# Patient Record
Sex: MALE | Race: WHITE | URBAN - METROPOLITAN AREA
[De-identification: names, ages, dates, MRNs, and addresses within clinical notes are randomized per-mention and may not be internally consistent; named-entity substitution may affect disease eponyms.]

---

## 2018-10-17 ENCOUNTER — APPOINTMENT (RX ONLY)
Dept: URBAN - METROPOLITAN AREA CLINIC 142 | Facility: CLINIC | Age: 48
Setting detail: DERMATOLOGY
End: 2018-10-17

## 2018-10-17 DIAGNOSIS — L20.89 OTHER ATOPIC DERMATITIS: ICD-10-CM

## 2018-10-17 PROBLEM — L20.84 INTRINSIC (ALLERGIC) ECZEMA: Status: ACTIVE | Noted: 2018-10-17

## 2018-10-17 PROCEDURE — ? TREATMENT REGIMEN

## 2018-10-17 PROCEDURE — ? PRESCRIPTION

## 2018-10-17 PROCEDURE — ? COUNSELING

## 2018-10-17 PROCEDURE — 99213 OFFICE O/P EST LOW 20 MIN: CPT

## 2018-10-17 RX ORDER — CLOBETASOL PROPIONATE 0.5 MG/G
CREAM TOPICAL
Qty: 60 | Refills: 6 | Status: ERX | COMMUNITY
Start: 2018-10-17

## 2018-10-17 RX ADMIN — CLOBETASOL PROPIONATE: 0.5 CREAM TOPICAL at 00:00

## 2018-10-17 ASSESSMENT — LOCATION DETAILED DESCRIPTION DERM
LOCATION DETAILED: RIGHT PROXIMAL DORSAL FOREARM
LOCATION DETAILED: LEFT ANTERIOR PROXIMAL THIGH
LOCATION DETAILED: RIGHT ANTERIOR PROXIMAL THIGH
LOCATION DETAILED: LEFT PROXIMAL DORSAL FOREARM

## 2018-10-17 ASSESSMENT — LOCATION ZONE DERM
LOCATION ZONE: LEG
LOCATION ZONE: ARM

## 2018-10-17 ASSESSMENT — LOCATION SIMPLE DESCRIPTION DERM
LOCATION SIMPLE: RIGHT FOREARM
LOCATION SIMPLE: LEFT THIGH
LOCATION SIMPLE: LEFT FOREARM
LOCATION SIMPLE: RIGHT THIGH

## 2018-11-08 ENCOUNTER — RX ONLY (OUTPATIENT)
Age: 48
Setting detail: RX ONLY
End: 2018-11-08

## 2018-11-08 RX ORDER — PREDNISONE 10 MG/1
TABLET ORAL
Qty: 24 | Refills: 0 | COMMUNITY
Start: 2018-11-08

## 2019-04-04 ENCOUNTER — APPOINTMENT (RX ONLY)
Dept: URBAN - METROPOLITAN AREA CLINIC 142 | Facility: CLINIC | Age: 49
Setting detail: DERMATOLOGY
End: 2019-04-04

## 2019-04-04 DIAGNOSIS — L20.89 OTHER ATOPIC DERMATITIS: ICD-10-CM

## 2019-04-04 DIAGNOSIS — B35.3 TINEA PEDIS: ICD-10-CM

## 2019-04-04 PROBLEM — L20.84 INTRINSIC (ALLERGIC) ECZEMA: Status: ACTIVE | Noted: 2019-04-04

## 2019-04-04 PROCEDURE — ? PRESCRIPTION

## 2019-04-04 PROCEDURE — ? COUNSELING

## 2019-04-04 PROCEDURE — 99213 OFFICE O/P EST LOW 20 MIN: CPT

## 2019-04-04 PROCEDURE — ? TREATMENT REGIMEN

## 2019-04-04 RX ORDER — DOXYCYCLINE HYCLATE 100 MG/1
CAPSULE, GELATIN COATED ORAL
Qty: 20 | Refills: 0 | Status: ERX | COMMUNITY
Start: 2019-04-04

## 2019-04-04 RX ORDER — KETOCONAZOLE 20 MG/G
CREAM TOPICAL
Qty: 1 | Refills: 3 | Status: ERX | COMMUNITY
Start: 2019-04-04

## 2019-04-04 RX ORDER — TRIAMCINOLONE ACETONIDE 1 MG/G
OINTMENT TOPICAL
Qty: 1 | Refills: 2 | Status: ERX | COMMUNITY
Start: 2019-04-04

## 2019-04-04 RX ORDER — HYDROXYZINE HYDROCHLORIDE 25 MG/1
TABLET, FILM COATED ORAL
Qty: 30 | Refills: 1 | Status: ERX | COMMUNITY
Start: 2019-04-04

## 2019-04-04 RX ADMIN — HYDROXYZINE HYDROCHLORIDE: 25 TABLET, FILM COATED ORAL at 15:02

## 2019-04-04 RX ADMIN — KETOCONAZOLE: 20 CREAM TOPICAL at 15:02

## 2019-04-04 RX ADMIN — TRIAMCINOLONE ACETONIDE: 1 OINTMENT TOPICAL at 15:05

## 2019-04-04 RX ADMIN — DOXYCYCLINE HYCLATE: 100 CAPSULE, GELATIN COATED ORAL at 15:04

## 2019-04-04 ASSESSMENT — LOCATION ZONE DERM
LOCATION ZONE: LEG
LOCATION ZONE: FEET

## 2019-04-04 ASSESSMENT — LOCATION SIMPLE DESCRIPTION DERM
LOCATION SIMPLE: RIGHT PRETIBIAL REGION
LOCATION SIMPLE: LEFT PRETIBIAL REGION
LOCATION SIMPLE: LEFT PLANTAR SURFACE
LOCATION SIMPLE: RIGHT PLANTAR SURFACE

## 2019-04-04 ASSESSMENT — LOCATION DETAILED DESCRIPTION DERM
LOCATION DETAILED: LEFT DISTAL PRETIBIAL REGION
LOCATION DETAILED: RIGHT DISTAL PRETIBIAL REGION
LOCATION DETAILED: LEFT MEDIAL PLANTAR MIDFOOT
LOCATION DETAILED: RIGHT MEDIAL PLANTAR MIDFOOT

## 2019-04-04 NOTE — PROCEDURE: TREATMENT REGIMEN
Plan: Advised bleach bath
Otc Regimen: cerave anti-itch, cetaphil, vanicream, fragrance free soap and detergent
Detail Level: Zone

## 2019-10-05 ENCOUNTER — HOSPITAL ENCOUNTER (EMERGENCY)
Dept: HOSPITAL 96 - M.ERS | Age: 49
Discharge: HOME | End: 2019-10-05
Payer: COMMERCIAL

## 2019-10-05 VITALS — SYSTOLIC BLOOD PRESSURE: 135 MMHG | DIASTOLIC BLOOD PRESSURE: 82 MMHG

## 2019-10-05 VITALS — BODY MASS INDEX: 30.16 KG/M2 | WEIGHT: 199.01 LBS | HEIGHT: 68 IN

## 2019-10-05 DIAGNOSIS — E11.621: Primary | ICD-10-CM

## 2019-10-05 DIAGNOSIS — L03.116: ICD-10-CM

## 2019-10-05 DIAGNOSIS — L97.428: ICD-10-CM

## 2019-10-05 LAB
ABSOLUTE BASOPHILS: 0.1 THOU/UL (ref 0–0.2)
ABSOLUTE EOSINOPHILS: 0.2 THOU/UL (ref 0–0.7)
ABSOLUTE MONOCYTES: 0.7 THOU/UL (ref 0–1.2)
ALBUMIN SERPL-MCNC: 3.8 G/DL (ref 3.4–5)
ALP SERPL-CCNC: 115 U/L (ref 46–116)
ALT SERPL-CCNC: 32 U/L (ref 30–65)
ANION GAP SERPL CALC-SCNC: 10 MMOL/L (ref 7–16)
AST SERPL-CCNC: 24 U/L (ref 15–37)
BASOPHILS NFR BLD AUTO: 1.1 %
BE: -2.7 MMOL/L
BILIRUB SERPL-MCNC: 0.4 MG/DL
BUN SERPL-MCNC: 16 MG/DL (ref 7–18)
CALCIUM SERPL-MCNC: 8.7 MG/DL (ref 8.5–10.1)
CHLORIDE SERPL-SCNC: 98 MMOL/L (ref 98–107)
CO2 SERPL-SCNC: 25 MMOL/L (ref 21–32)
CREAT SERPL-MCNC: 0.8 MG/DL (ref 0.6–1.3)
EOSINOPHIL NFR BLD: 1.9 %
GAS PNL BLDV: 64.5 MMHG (ref 35–45)
GLUCOSE SERPL-MCNC: 488 MG/DL (ref 70–99)
GRANULOCYTES NFR BLD MANUAL: 72.9 %
HCT VFR BLD CALC: 41.5 % (ref 42–52)
HGB BLD-MCNC: 14.4 GM/DL (ref 14–18)
LYMPHOCYTES # BLD: 1.2 THOU/UL (ref 0.8–5.3)
LYMPHOCYTES NFR BLD AUTO: 15.1 %
MCH RBC QN AUTO: 27.7 PG (ref 26–34)
MCHC RBC AUTO-ENTMCNC: 34.6 G/DL (ref 28–37)
MCV RBC: 80.1 FL (ref 80–100)
MONOCYTES NFR BLD: 9 %
MPV: 7.5 FL. (ref 7.2–11.1)
NEUTROPHILS # BLD: 5.9 THOU/UL (ref 1.6–8.1)
NUCLEATED RBCS: 0 /100WBC
PCO2 BLDV: 43.9 MMHG (ref 41–51)
PLATELET COUNT*: 235 THOU/UL (ref 150–400)
POTASSIUM SERPL-SCNC: 3.9 MMOL/L (ref 3.5–5.1)
PROT SERPL-MCNC: 7.1 G/DL (ref 6.4–8.2)
RBC # BLD AUTO: 5.18 MIL/UL (ref 4.5–6)
RDW-CV: 13 % (ref 10.5–14.5)
SODIUM SERPL-SCNC: 133 MMOL/L (ref 136–145)
TROPONIN-I LEVEL: <0.06 NG/ML (ref ?–0.06)
WBC # BLD AUTO: 8.1 THOU/UL (ref 4–11)

## 2019-10-07 NOTE — EKG
[place] [address]
Phone:  [phone]                     ELECTROCARDIOGRAM REPORT      
_______________________________________________________________________________
 
Name:       AWILDA VU        Room:                      [facility]#:  [id number]      Account #:      [id number]  
Admission:  10/05/19     Attend Phys:                         
Discharge:  10/05/19     Date of Birth:  [birth year]  
         Report #: [id number]
    [id number]
_______________________________________________________________________________
THIS REPORT FOR:  //name//                      
 
                         [facility] ED
                                       
Test Date:    2019-10-05               Test Time:    08:12:27
Pat Name:     AWILDA VU          Department:   
Patient ID:   [id number]            Room:          
Gender:                               Technician:   
:          1970               Requested By: Dilip Crook
Order Number: [id number] MD:   Manny Wilson
                                 Measurements
Intervals                              Axis          
Rate:         118                      P:            23
GA:           160                      QRS:          17
QRSD:         113                      T:            2
QT:           334                                    
QTc:          469                                    
                           Interpretive Statements
Sinus tachycardia
Borderline intraventricular conduction delay
No previous ECG available for comparison
 
Electronically Signed On 10-7-2019 16:11:49 CDT by Manny Wilson
[email or web address]
 
 
 
 
 
 
 
 
 
 
 
 
 
 
 
 
 
 
 
  <ELECTRONICALLY SIGNED>
                                           By: Manny Wilson MD, [facility]     
  10/07/19     1611
D: 10/05/19 0812   _____________________________________
T: 10/05/19 0812   Manny Wilson MD, FACC       /EPI

## 2019-10-17 ENCOUNTER — HOSPITAL ENCOUNTER (OUTPATIENT)
Dept: HOSPITAL 96 - M.WC | Age: 49
End: 2019-10-17
Payer: COMMERCIAL

## 2019-10-17 DIAGNOSIS — L97.522: ICD-10-CM

## 2019-10-17 DIAGNOSIS — I10: ICD-10-CM

## 2019-10-17 DIAGNOSIS — E11.621: Primary | ICD-10-CM

## 2019-10-17 DIAGNOSIS — Z87.891: ICD-10-CM

## 2019-10-18 LAB
EST. AVERAGE GLUCOSE BLD GHB EST-MCNC: 292 MG/DL
GLYCOHEMOGLOBIN (HGB A1C): 11.8 % (ref 4.8–5.6)

## 2019-10-24 ENCOUNTER — HOSPITAL ENCOUNTER (OUTPATIENT)
Dept: HOSPITAL 96 - M.WC | Age: 49
End: 2019-10-24
Attending: FAMILY MEDICINE
Payer: COMMERCIAL

## 2019-10-24 DIAGNOSIS — I10: ICD-10-CM

## 2019-10-24 DIAGNOSIS — Z87.891: ICD-10-CM

## 2019-10-24 DIAGNOSIS — L97.522: ICD-10-CM

## 2019-10-24 DIAGNOSIS — E11.42: ICD-10-CM

## 2019-10-24 DIAGNOSIS — E11.65: ICD-10-CM

## 2019-10-24 DIAGNOSIS — E11.621: Primary | ICD-10-CM

## 2019-11-01 ENCOUNTER — HOSPITAL ENCOUNTER (OUTPATIENT)
Dept: HOSPITAL 96 - M.WC | Age: 49
End: 2019-11-01
Attending: FAMILY MEDICINE
Payer: COMMERCIAL

## 2019-11-01 DIAGNOSIS — L97.522: ICD-10-CM

## 2019-11-01 DIAGNOSIS — E11.621: Primary | ICD-10-CM

## 2019-11-01 DIAGNOSIS — I10: ICD-10-CM

## 2019-11-01 DIAGNOSIS — Z87.891: ICD-10-CM

## 2019-11-01 DIAGNOSIS — E11.65: ICD-10-CM

## 2019-11-01 DIAGNOSIS — L84: ICD-10-CM

## 2019-11-08 ENCOUNTER — HOSPITAL ENCOUNTER (OUTPATIENT)
Dept: HOSPITAL 96 - M.WC | Age: 49
End: 2019-11-08
Attending: FAMILY MEDICINE
Payer: COMMERCIAL

## 2019-11-08 DIAGNOSIS — E11.65: ICD-10-CM

## 2019-11-08 DIAGNOSIS — Z87.891: ICD-10-CM

## 2019-11-08 DIAGNOSIS — E11.621: Primary | ICD-10-CM

## 2019-11-08 DIAGNOSIS — I10: ICD-10-CM

## 2019-11-08 DIAGNOSIS — L97.522: ICD-10-CM

## 2019-11-14 ENCOUNTER — HOSPITAL ENCOUNTER (OUTPATIENT)
Dept: HOSPITAL 96 - M.WC | Age: 49
End: 2019-11-14
Attending: FAMILY MEDICINE
Payer: COMMERCIAL

## 2019-11-14 DIAGNOSIS — Z87.891: ICD-10-CM

## 2019-11-14 DIAGNOSIS — L84: ICD-10-CM

## 2019-11-14 DIAGNOSIS — E11.621: Primary | ICD-10-CM

## 2019-11-14 DIAGNOSIS — E11.65: ICD-10-CM

## 2019-11-14 DIAGNOSIS — I10: ICD-10-CM

## 2019-11-14 DIAGNOSIS — L97.522: ICD-10-CM

## 2019-11-22 ENCOUNTER — HOSPITAL ENCOUNTER (OUTPATIENT)
Dept: HOSPITAL 96 - M.WC | Age: 49
End: 2019-11-22
Attending: FAMILY MEDICINE
Payer: COMMERCIAL

## 2019-11-22 DIAGNOSIS — E11.621: Primary | ICD-10-CM

## 2019-11-22 DIAGNOSIS — L84: ICD-10-CM

## 2019-11-22 DIAGNOSIS — L97.522: ICD-10-CM

## 2019-11-22 DIAGNOSIS — E11.65: ICD-10-CM

## 2019-11-22 DIAGNOSIS — Z87.891: ICD-10-CM

## 2019-11-22 DIAGNOSIS — I10: ICD-10-CM

## 2019-12-06 ENCOUNTER — HOSPITAL ENCOUNTER (OUTPATIENT)
Dept: HOSPITAL 96 - M.WC | Age: 49
End: 2019-12-06
Attending: FAMILY MEDICINE
Payer: COMMERCIAL

## 2019-12-06 DIAGNOSIS — E11.621: Primary | ICD-10-CM

## 2019-12-06 DIAGNOSIS — L97.522: ICD-10-CM

## 2019-12-06 DIAGNOSIS — E11.42: ICD-10-CM

## 2019-12-06 DIAGNOSIS — L84: ICD-10-CM

## 2019-12-06 DIAGNOSIS — E11.65: ICD-10-CM

## 2019-12-06 DIAGNOSIS — Z87.891: ICD-10-CM

## 2019-12-06 DIAGNOSIS — I10: ICD-10-CM

## 2019-12-20 ENCOUNTER — HOSPITAL ENCOUNTER (OUTPATIENT)
Dept: HOSPITAL 96 - M.WC | Age: 49
End: 2019-12-20
Attending: FAMILY MEDICINE
Payer: COMMERCIAL

## 2019-12-20 DIAGNOSIS — E11.621: Primary | ICD-10-CM

## 2019-12-20 DIAGNOSIS — E11.65: ICD-10-CM

## 2019-12-20 DIAGNOSIS — E11.42: ICD-10-CM

## 2019-12-20 DIAGNOSIS — L84: ICD-10-CM

## 2019-12-20 DIAGNOSIS — Z87.891: ICD-10-CM

## 2019-12-20 DIAGNOSIS — L97.521: ICD-10-CM

## 2019-12-20 DIAGNOSIS — I10: ICD-10-CM

## 2020-11-25 ENCOUNTER — HOSPITAL ENCOUNTER (EMERGENCY)
Dept: HOSPITAL 96 - M.ERS | Age: 50
Discharge: HOME | End: 2020-11-25
Payer: COMMERCIAL

## 2020-11-25 VITALS — WEIGHT: 189.99 LBS | HEIGHT: 69 IN | BODY MASS INDEX: 28.14 KG/M2

## 2020-11-25 VITALS — SYSTOLIC BLOOD PRESSURE: 111 MMHG | DIASTOLIC BLOOD PRESSURE: 64 MMHG

## 2020-11-25 DIAGNOSIS — E11.9: ICD-10-CM

## 2020-11-25 DIAGNOSIS — I10: ICD-10-CM

## 2020-11-25 DIAGNOSIS — L03.032: Primary | ICD-10-CM

## 2020-11-25 DIAGNOSIS — I16.0: ICD-10-CM

## 2020-11-25 LAB
ABSOLUTE BASOPHILS: 0.1 THOU/UL (ref 0–0.2)
ABSOLUTE EOSINOPHILS: 0.1 THOU/UL (ref 0–0.7)
ABSOLUTE MONOCYTES: 0.5 THOU/UL (ref 0–1.2)
ALBUMIN SERPL-MCNC: 4 G/DL (ref 3.4–5)
ALP SERPL-CCNC: 82 U/L (ref 46–116)
ALT SERPL-CCNC: 32 U/L (ref 30–65)
ANION GAP SERPL CALC-SCNC: 9 MMOL/L (ref 7–16)
AST SERPL-CCNC: 13 U/L (ref 15–37)
BASOPHILS NFR BLD AUTO: 0.9 %
BILIRUB SERPL-MCNC: 0.4 MG/DL
BUN SERPL-MCNC: 11 MG/DL (ref 7–18)
CALCIUM SERPL-MCNC: 8.6 MG/DL (ref 8.5–10.1)
CHLORIDE SERPL-SCNC: 100 MMOL/L (ref 98–107)
CO2 SERPL-SCNC: 27 MMOL/L (ref 21–32)
CREAT SERPL-MCNC: 0.7 MG/DL (ref 0.6–1.3)
EOSINOPHIL NFR BLD: 1.9 %
GLUCOSE SERPL-MCNC: 328 MG/DL (ref 70–99)
GRANULOCYTES NFR BLD MANUAL: 71.6 %
HCT VFR BLD CALC: 42.6 % (ref 42–52)
HGB BLD-MCNC: 14.7 GM/DL (ref 14–18)
LYMPHOCYTES # BLD: 1.3 THOU/UL (ref 0.8–5.3)
LYMPHOCYTES NFR BLD AUTO: 18.1 %
MCH RBC QN AUTO: 26.9 PG (ref 26–34)
MCHC RBC AUTO-ENTMCNC: 34.4 G/DL (ref 28–37)
MCV RBC: 78.1 FL (ref 80–100)
MONOCYTES NFR BLD: 7.5 %
MPV: 7.2 FL. (ref 7.2–11.1)
NEUTROPHILS # BLD: 5 THOU/UL (ref 1.6–8.1)
NUCLEATED RBCS: 0 /100WBC
PLATELET COUNT*: 260 THOU/UL (ref 150–400)
POTASSIUM SERPL-SCNC: 3.8 MMOL/L (ref 3.5–5.1)
PROT SERPL-MCNC: 7.1 G/DL (ref 6.4–8.2)
RBC # BLD AUTO: 5.45 MIL/UL (ref 4.5–6)
RDW-CV: 13.3 % (ref 10.5–14.5)
SODIUM SERPL-SCNC: 136 MMOL/L (ref 136–145)
WBC # BLD AUTO: 7 THOU/UL (ref 4–11)

## 2021-01-21 ENCOUNTER — HOSPITAL ENCOUNTER (INPATIENT)
Dept: HOSPITAL 96 - M.PRE | Age: 51
LOS: 5 days | Discharge: HOME | DRG: 622 | End: 2021-01-26
Attending: INTERNAL MEDICINE | Admitting: INTERNAL MEDICINE
Payer: COMMERCIAL

## 2021-01-21 VITALS — BODY MASS INDEX: 28.34 KG/M2 | HEIGHT: 67.99 IN | WEIGHT: 187 LBS

## 2021-01-21 VITALS — DIASTOLIC BLOOD PRESSURE: 82 MMHG | SYSTOLIC BLOOD PRESSURE: 132 MMHG

## 2021-01-21 VITALS — DIASTOLIC BLOOD PRESSURE: 87 MMHG | SYSTOLIC BLOOD PRESSURE: 134 MMHG

## 2021-01-21 DIAGNOSIS — L03.032: ICD-10-CM

## 2021-01-21 DIAGNOSIS — E11.65: ICD-10-CM

## 2021-01-21 DIAGNOSIS — E11.621: ICD-10-CM

## 2021-01-21 DIAGNOSIS — S91.109A: ICD-10-CM

## 2021-01-21 DIAGNOSIS — E11.42: ICD-10-CM

## 2021-01-21 DIAGNOSIS — I10: ICD-10-CM
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
## 2021-01-21 DIAGNOSIS — Z87.891: ICD-10-CM

## 2021-01-21 DIAGNOSIS — Z20.822: ICD-10-CM

## 2021-01-21 DIAGNOSIS — L97.519: ICD-10-CM
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
## 2021-01-21 DIAGNOSIS — Z91.19: ICD-10-CM

## 2021-01-21 DIAGNOSIS — J15.6: ICD-10-CM

## 2021-01-21 DIAGNOSIS — E11.628: Primary | ICD-10-CM
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
## 2021-01-21 DIAGNOSIS — L97.529: ICD-10-CM

## 2021-01-21 DIAGNOSIS — L03.031: ICD-10-CM
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
## 2021-01-21 LAB
ALBUMIN SERPL-MCNC: 2.5 G/DL (ref 3.4–5)
ALP SERPL-CCNC: 125 U/L (ref 46–116)
ALT SERPL-CCNC: 22 U/L (ref 30–65)
ANION GAP SERPL CALC-SCNC: 12 MMOL/L (ref 7–16)
AST SERPL-CCNC: 13 U/L (ref 15–37)
BILIRUB SERPL-MCNC: 0.7 MG/DL
BILIRUB UR-MCNC: NEGATIVE MG/DL
BUN SERPL-MCNC: 12 MG/DL (ref 7–18)
CALCIUM SERPL-MCNC: 9.4 MG/DL (ref 8.5–10.1)
CHLORIDE SERPL-SCNC: 101 MMOL/L (ref 98–107)
CO2 SERPL-SCNC: 25 MMOL/L (ref 21–32)
COLOR UR: YELLOW
CREAT SERPL-MCNC: 0.8 MG/DL (ref 0.6–1.3)
GLUCOSE SERPL-MCNC: 274 MG/DL (ref 70–99)
HCT VFR BLD CALC: 39.5 % (ref 42–52)
HGB BLD-MCNC: 13.2 GM/DL (ref 14–18)
KETONES UR STRIP-MCNC: (no result) MG/DL
MCH RBC QN AUTO: 26.7 PG (ref 26–34)
MCHC RBC AUTO-ENTMCNC: 33.5 G/DL (ref 28–37)
MCV RBC: 79.7 FL (ref 80–100)
MPV: 6.9 FL. (ref 7.2–11.1)
PLATELET COUNT*: 276 THOU/UL (ref 150–400)
POTASSIUM SERPL-SCNC: 4.2 MMOL/L (ref 3.5–5.1)
PROT SERPL-MCNC: 6.9 G/DL (ref 6.4–8.2)
PROT UR QL STRIP: (no result)
RBC # BLD AUTO: 4.96 MIL/UL (ref 4.5–6)
RBC # UR STRIP: NEGATIVE /UL
RDW-CV: 14.2 % (ref 10.5–14.5)
SODIUM SERPL-SCNC: 138 MMOL/L (ref 136–145)
SP GR UR STRIP: 1.02 (ref 1–1.03)
URINE CLARITY: CLEAR
URINE GLUCOSE-RANDOM: (no result)
URINE LEUKOCYTES-REFLEX: NEGATIVE
URINE NITRITE-REFLEX: NEGATIVE
UROBILINOGEN UR STRIP-ACNC: 0.2 E.U./DL (ref 0.2–1)
WBC # BLD AUTO: 12.5 THOU/UL (ref 4–11)

## 2021-01-21 NOTE — NUR
I ASSUMED CARE OF THE PATIENT AS AN ADMISSION FROM THE ED.  HE ARRIVED VIA
CART AND TRANSFERED ON HIS OWN.  THE IS ALERT AND ORIENTED X4 AND BED IS IN
THE LOW LOCKED POSITION.  CALL LIGHT IS IN REACH AND PATIENT NEEDS ARE MET
WHILE HOURLY ROUNDING.  CONSULT WITH INFECTIOUS DISEASE IS COMPLETED AND NEW
ORDERS ARE OBTAINED.  BOTH FEET ARE CULTURED AND SENT OFF.  CXRAY CAME BACK
AND HE IS TRANSFERED TO [facility] FOR ISOLATION.  REPORT IS CALLED AND PATIENT AND
BELONGINGS ARE DELIVERED AFTER CT IS COMPLETED.  RESOURCE NURSE COMPLETED
ADMISSION.  MED SURG STATUS IS MET.  POSSIBLE ULTRASOUND AND MRI TOMORROW.
PATIENT WAS A DIRECT ADMIT AND BLOOD GLUCOSE IS MONITORED.

## 2021-01-22 VITALS — DIASTOLIC BLOOD PRESSURE: 90 MMHG | SYSTOLIC BLOOD PRESSURE: 145 MMHG

## 2021-01-22 VITALS — SYSTOLIC BLOOD PRESSURE: 144 MMHG | DIASTOLIC BLOOD PRESSURE: 87 MMHG

## 2021-01-22 LAB
ALBUMIN SERPL-MCNC: 2.9 G/DL (ref 3.4–5)
ALP SERPL-CCNC: 101 U/L (ref 46–116)
ALT SERPL-CCNC: 17 U/L (ref 30–65)
ANION GAP SERPL CALC-SCNC: 8 MMOL/L (ref 7–16)
AST SERPL-CCNC: 10 U/L (ref 15–37)
BILIRUB SERPL-MCNC: 0.4 MG/DL
BUN SERPL-MCNC: 9 MG/DL (ref 7–18)
CALCIUM SERPL-MCNC: 8.4 MG/DL (ref 8.5–10.1)
CHLORIDE SERPL-SCNC: 104 MMOL/L (ref 98–107)
CO2 SERPL-SCNC: 26 MMOL/L (ref 21–32)
CREAT SERPL-MCNC: 0.7 MG/DL (ref 0.6–1.3)
EST. AVERAGE GLUCOSE BLD GHB EST-MCNC: 309 MG/DL
GLUCOSE SERPL-MCNC: 257 MG/DL (ref 70–99)
GLYCOHEMOGLOBIN (HGB A1C): 12.4 % (ref 4.8–5.6)
HCT VFR BLD CALC: 35.3 % (ref 42–52)
HGB BLD-MCNC: 12.1 GM/DL (ref 14–18)
MAGNESIUM SERPL-MCNC: 2.1 MG/DL (ref 1.8–2.4)
MCH RBC QN AUTO: 27.2 PG (ref 26–34)
MCHC RBC AUTO-ENTMCNC: 34.2 G/DL (ref 28–37)
MCV RBC: 79.4 FL (ref 80–100)
MPV: 7.3 FL. (ref 7.2–11.1)
PLATELET COUNT*: 235 THOU/UL (ref 150–400)
POTASSIUM SERPL-SCNC: 4 MMOL/L (ref 3.5–5.1)
PROT SERPL-MCNC: 6.4 G/DL (ref 6.4–8.2)
RBC # BLD AUTO: 4.45 MIL/UL (ref 4.5–6)
RDW-CV: 14.2 % (ref 10.5–14.5)
SODIUM SERPL-SCNC: 138 MMOL/L (ref 136–145)
WBC # BLD AUTO: 9.8 THOU/UL (ref 4–11)

## 2021-01-22 NOTE — NUR
PT IS AO X4 WITH RA SATTING 98-99. PT HAS A NONPRODUCTIVE COUGH THAT IS
FREQUENT. PHOTOS TAKEN OF FOOT WOUNDS FOR ADMISSION. HE HAS COMPLETED SEVERAL
STUDIES AD THE RESULTS RE ON THE CHART.PT HAS SLEPT VERY LITTLE THIS PM SHIFT
AND HAS HAD MANY QUESTIONS. GOOD PO INTAKE AND AMBULATING TO THE BATHROOM

## 2021-01-22 NOTE — NUR
PT.LIVES ALONE. WORKS OUTSIDE THE HOME. NO USE OF DME. NO HX OF HH OR SNF.
WOUND CX AND BLOOD CX PENDING. PODAITRY CONSULT PENDING. PT.SEEMS TO BE VERY
NON COMPLIANT WITH HIS DIABETES, TAKING HIS MEDS,TAKING CARE OF HIMSELF.
CM WILL CONTINUE TO FOLLOW.

## 2021-01-22 NOTE — NUR
WOUND NURSE:  PATIENT SEEN TO ADDRESS WOUNDS ON BLE.  RIGHT GREAT TOE, PLANTAR
SURFACE PRESENTS WITH CALLOUSED PERIWOUND AND DARK RED NONGRANULATINGN TISSUE
IN A WOUND BED MEASURING 1 X 1 X 0.1 CM, WOUND ON 5TH DIGIT AND METATARSAL OF
RIGHT FOOT PRSENTING AS A DARK PURPLISH LESION WITH FLUCTUANCE AND PINK
PURULENT DRAINAGE PRESENT IN SMALL AMOUNT. WOUND BED IS OBSCURRED FROM
PRESENCE OF CALLOUS AND LOOSE EPIDERMIS.  PATIENT STATES LESIONS STARTED BACK
IN NOVEMBER OF LAST YEAR.  PATIENT REPORTS NO PAIN R/T DIABETIC NEUROPATHY.
CLEANSED WITH SOAP AND WATER, RINSED WITH WATER, THEN PATTED DRY.  APPLIED
AQUACEL AG TO BOTH WOUNDS.  COVERED RT FOOT WOUND WITH ABD UNDER KERLEX THEN
SECURED WITH TAPE.  LEFT GREAT TOE WOUND COVERED WITH CONFORMING GAUZE AND
SECURED WITH TAPE.  THIS WAS TOLERATED WELL BY THE PATIENT.  PATIENT
INSTRUCTED ON MEASURES TO PROMOTE HEALING E.G., OFFLOADING WOUNDS, DIET WITH
ADEQUATE PROTEIN, VIT C, AND ZINC.  PATIENT STATES HE UNDERSTNADS.

## 2021-01-23 VITALS — DIASTOLIC BLOOD PRESSURE: 87 MMHG | SYSTOLIC BLOOD PRESSURE: 141 MMHG

## 2021-01-23 VITALS — SYSTOLIC BLOOD PRESSURE: 153 MMHG | DIASTOLIC BLOOD PRESSURE: 95 MMHG

## 2021-01-23 VITALS — SYSTOLIC BLOOD PRESSURE: 138 MMHG | DIASTOLIC BLOOD PRESSURE: 89 MMHG

## 2021-01-23 VITALS — DIASTOLIC BLOOD PRESSURE: 91 MMHG | SYSTOLIC BLOOD PRESSURE: 148 MMHG

## 2021-01-23 LAB
ALBUMIN SERPL-MCNC: 2.7 G/DL (ref 3.4–5)
ALP SERPL-CCNC: 103 U/L (ref 46–116)
ALT SERPL-CCNC: 15 U/L (ref 30–65)
ANION GAP SERPL CALC-SCNC: 8 MMOL/L (ref 7–16)
AST SERPL-CCNC: 7 U/L (ref 15–37)
BILIRUB SERPL-MCNC: 0.4 MG/DL
BUN SERPL-MCNC: 8 MG/DL (ref 7–18)
CALCIUM SERPL-MCNC: 8.7 MG/DL (ref 8.5–10.1)
CHLORIDE SERPL-SCNC: 102 MMOL/L (ref 98–107)
CO2 SERPL-SCNC: 29 MMOL/L (ref 21–32)
CREAT SERPL-MCNC: 0.8 MG/DL (ref 0.6–1.3)
GLUCOSE SERPL-MCNC: 286 MG/DL (ref 70–99)
HCT VFR BLD CALC: 34.8 % (ref 42–52)
HGB BLD-MCNC: 11.4 GM/DL (ref 14–18)
MAGNESIUM SERPL-MCNC: 1.9 MG/DL (ref 1.8–2.4)
MCH RBC QN AUTO: 26.4 PG (ref 26–34)
MCHC RBC AUTO-ENTMCNC: 32.9 G/DL (ref 28–37)
MCV RBC: 80.3 FL (ref 80–100)
MPV: 7.3 FL. (ref 7.2–11.1)
PLATELET COUNT*: 274 THOU/UL (ref 150–400)
POTASSIUM SERPL-SCNC: 3.9 MMOL/L (ref 3.5–5.1)
PROT SERPL-MCNC: 6.4 G/DL (ref 6.4–8.2)
RBC # BLD AUTO: 4.33 MIL/UL (ref 4.5–6)
RDW-CV: 14 % (ref 10.5–14.5)
SODIUM SERPL-SCNC: 139 MMOL/L (ref 136–145)
WBC # BLD AUTO: 8.4 THOU/UL (ref 4–11)

## 2021-01-23 NOTE — NUR
ASSUMED P T'S CARE AT ABOUT 2000. ALERT AND ORIENTED. ABLE TO AMBULATE
INDEPENDENTLY TO BATHROOM. DRESSING TO LEFT AND RIGHT FOOT C/D/I. PT DID GET
SOME SLEEP BUT WAS MOSTLY ANXIOUS AND TEARFUL ABOUT THE POSSIBLITY OF LOSING
HIS LEFT 5TH TOE. TYENOL GIVEN FOR PAIN PER PT'S REQUEST. PRN BENADRL ALSO
GIVEN TO HELP WITH ANXIETY. PT REINFORCED ON IMPORTANCE OF ADHERING TO HEALTHY
DIET AND LIFESTYLES IN ORDER TO HELP REDUCE COMPLICATIONS OF DIABETES. PT
VOICED REPEATED FURSTRATION AND FEAR OF LOSING HIS TOE, ALTHOUGH HE VOICED
THAT THE DOC SAID IT WAS A "POSSIBLITY" BUT NOT CERTAIN.  NURSING STAYED AND
LISTENED TO PT TO CALM AND ENCOURAGE PT. PT VOICED HE WOULD TRY TO PRACTICE
BETTER HEALTHY CHOICES. CALL LIGHT WITHIN REACH. WILL CONTINUE TO MONITOR.

## 2021-01-24 VITALS — DIASTOLIC BLOOD PRESSURE: 97 MMHG | SYSTOLIC BLOOD PRESSURE: 157 MMHG

## 2021-01-24 VITALS — SYSTOLIC BLOOD PRESSURE: 157 MMHG | DIASTOLIC BLOOD PRESSURE: 91 MMHG

## 2021-01-24 VITALS — SYSTOLIC BLOOD PRESSURE: 160 MMHG | DIASTOLIC BLOOD PRESSURE: 93 MMHG

## 2021-01-24 LAB
ANION GAP SERPL CALC-SCNC: 7 MMOL/L (ref 7–16)
BUN SERPL-MCNC: 8 MG/DL (ref 7–18)
CALCIUM SERPL-MCNC: 9.3 MG/DL (ref 8.5–10.1)
CHLORIDE SERPL-SCNC: 104 MMOL/L (ref 98–107)
CO2 SERPL-SCNC: 30 MMOL/L (ref 21–32)
CREAT SERPL-MCNC: 0.7 MG/DL (ref 0.6–1.3)
GLUCOSE SERPL-MCNC: 109 MG/DL (ref 70–99)
HCT VFR BLD CALC: 35.9 % (ref 42–52)
HGB BLD-MCNC: 12 GM/DL (ref 14–18)
MCH RBC QN AUTO: 26.8 PG (ref 26–34)
MCHC RBC AUTO-ENTMCNC: 33.3 G/DL (ref 28–37)
MCV RBC: 80.3 FL (ref 80–100)
MPV: 7.1 FL. (ref 7.2–11.1)
PLATELET COUNT*: 315 THOU/UL (ref 150–400)
POTASSIUM SERPL-SCNC: 3.3 MMOL/L (ref 3.5–5.1)
RBC # BLD AUTO: 4.47 MIL/UL (ref 4.5–6)
RDW-CV: 14 % (ref 10.5–14.5)
SODIUM SERPL-SCNC: 141 MMOL/L (ref 136–145)
WBC # BLD AUTO: 7.1 THOU/UL (ref 4–11)

## 2021-01-24 NOTE — NUR
PT AO X4 STAYING IN BED MOST OF THE DAY. HE IS ANXIOUS ABOUT LOSING HIS TOE
AND GETS QUITE UPSET ABOUT IT.  BLOOD SUGARS WELL CONTROLLED ON SSI . PT IS
TRYING TO MAKE BETTER DIETARY CHOICES TO HELP CONTROL DM. PT DENIES PAIN,
LUNGS CTA WITH NON PRODUCTIVE COUGH.  PT IS STILL ON RA. POTASSIUM HAS BEEN
REPLACED TODAY AND AWAITING REDRAW. PT IS TO HAVE MRI TOMORROW AND DR HOYT
HAS MADE HIM NPO AFTER 6AM FOR POSSIBLE SURGERY

## 2021-01-25 VITALS — DIASTOLIC BLOOD PRESSURE: 84 MMHG | SYSTOLIC BLOOD PRESSURE: 143 MMHG

## 2021-01-25 VITALS — SYSTOLIC BLOOD PRESSURE: 135 MMHG | DIASTOLIC BLOOD PRESSURE: 72 MMHG

## 2021-01-25 VITALS — SYSTOLIC BLOOD PRESSURE: 162 MMHG | DIASTOLIC BLOOD PRESSURE: 95 MMHG

## 2021-01-25 LAB
ANION GAP SERPL CALC-SCNC: 5 MMOL/L (ref 7–16)
BUN SERPL-MCNC: 9 MG/DL (ref 7–18)
CALCIUM SERPL-MCNC: 9.1 MG/DL (ref 8.5–10.1)
CHLORIDE SERPL-SCNC: 103 MMOL/L (ref 98–107)
CO2 SERPL-SCNC: 30 MMOL/L (ref 21–32)
CREAT SERPL-MCNC: 0.8 MG/DL (ref 0.6–1.3)
GLUCOSE SERPL-MCNC: 193 MG/DL (ref 70–99)
HCT VFR BLD CALC: 37.8 % (ref 42–52)
HGB BLD-MCNC: 12.3 GM/DL (ref 14–18)
MCH RBC QN AUTO: 26.4 PG (ref 26–34)
MCHC RBC AUTO-ENTMCNC: 32.6 G/DL (ref 28–37)
MCV RBC: 80.8 FL (ref 80–100)
MPV: 7 FL. (ref 7.2–11.1)
PLATELET COUNT*: 306 THOU/UL (ref 150–400)
POTASSIUM SERPL-SCNC: 4.3 MMOL/L (ref 3.5–5.1)
RBC # BLD AUTO: 4.68 MIL/UL (ref 4.5–6)
RDW-CV: 13.8 % (ref 10.5–14.5)
SODIUM SERPL-SCNC: 138 MMOL/L (ref 136–145)
WBC # BLD AUTO: 7.1 THOU/UL (ref 4–11)

## 2021-01-25 NOTE — NUR
WOUND NURSE:  PLANNED TO SEE PATIENT FOR WOUND ASSESSMENT, BUT PATIENT
REPORTING THAT DR. GARCIA HAS COME IN EVERYDAY TO CHANGE HIS DRESSING AND LOOK
AT HIS FEET.  PATIENT NOT ASSESSED BY THIS NURSE AS A RESULT.

## 2021-01-25 NOTE — NUR
UNABLE TO SPEAK WITH PT.TODAY. WAS GONE MOST OF LATE MORNING/EARLY AFTERNOON
FOR MRI. POTENTIAL FOR INTERVENTION BY  PENDING RESULTS OF MRI.

## 2021-01-25 NOTE — NUR
PT A&OX4 VSS. PT UP AD MILES. WOUNDS TO BLE, DR GARCIA FOLLOWING. PT NPO THIS AM
PRIOR TO MRI. PT ACCUCHECK, INSULIN ADMINISTERED AS DIRECTED. PT CLEARED TO
RESUME CARB CONTROL DIET [id number] RESULTS OF MRI. IV TO LFA, ABX INFUSING AS
ORDERED. PT REMAINS CONTINENT OF B/B. DRESSINGS/WOUNDS ASSESSED BY DR GARCIA
THIS EVENING. PT RESTS ION BED WITH CALL LIGHT IN REACH.

## 2021-01-25 NOTE — NUR
RIGHT BASILIC VESSEL ACCESSED FOR SINGLE LUMEN PICC.  LINE PRE-TRIMMED TO 38CM
AND ADVANCED TO THE ZERO PARAMJIT WITH NO RESISTANCE MET.  UPPER ARM CIRCUMFERENCE
ABOVE INSERTION SITE=12".  POST PROCEDURE CXR SHOWS LINE TERMINATES BELOW THE
ELI 3-4CM.  GUIDEWIRE REMOVED, LINE FLUSHED AND INSERTION SITE DRESSED.
REPORT GIVEN TO LANG PENG.

## 2021-01-26 VITALS — DIASTOLIC BLOOD PRESSURE: 93 MMHG | SYSTOLIC BLOOD PRESSURE: 157 MMHG

## 2021-01-26 VITALS — SYSTOLIC BLOOD PRESSURE: 157 MMHG | DIASTOLIC BLOOD PRESSURE: 93 MMHG

## 2021-01-26 LAB
ANION GAP SERPL CALC-SCNC: 4 MMOL/L (ref 7–16)
BUN SERPL-MCNC: 10 MG/DL (ref 7–18)
CALCIUM SERPL-MCNC: 9.3 MG/DL (ref 8.5–10.1)
CHLORIDE SERPL-SCNC: 102 MMOL/L (ref 98–107)
CO2 SERPL-SCNC: 33 MMOL/L (ref 21–32)
CREAT SERPL-MCNC: 0.7 MG/DL (ref 0.6–1.3)
GLUCOSE SERPL-MCNC: 96 MG/DL (ref 70–99)
HCT VFR BLD CALC: 37.7 % (ref 42–52)
HGB BLD-MCNC: 12.5 GM/DL (ref 14–18)
MCH RBC QN AUTO: 26.3 PG (ref 26–34)
MCHC RBC AUTO-ENTMCNC: 33.3 G/DL (ref 28–37)
MCV RBC: 79.1 FL (ref 80–100)
MPV: 6.9 FL. (ref 7.2–11.1)
PLATELET COUNT*: 360 THOU/UL (ref 150–400)
POTASSIUM SERPL-SCNC: 3.7 MMOL/L (ref 3.5–5.1)
RBC # BLD AUTO: 4.76 MIL/UL (ref 4.5–6)
RDW-CV: 13.9 % (ref 10.5–14.5)
SODIUM SERPL-SCNC: 139 MMOL/L (ref 136–145)
WBC # BLD AUTO: 8.8 THOU/UL (ref 4–11)

## 2021-01-26 NOTE — NUR
PT.TO DISCHARGE TODAY TO HOME ON ORAL ANTIBIOTICS. DISCUSSED WITH . NO
HH ORDERED. PT.TO FOLLOW UP IN WOUND CARE CLINIC IN ONE WEEK WITH .
PT.COVID POSITIVE ON 1/21. [facility] SAID HE WOULD NEED TO BE PUSHED BACK TO
FEB.3 AT 1300. CM CONTACTED  OFFICE TO MAKE SURE IT WAS OK TO NOT SEE
PT.FOR 2 WEEKS.  SAID THAT WOULD BE JUST FINE. HIS OFFICE
WOULD NOT HAVE SEEN HIM UNITL 2/4 SO WILL KEEP WOUND CARE CENTER APPT. NURSING
TO TEACH PT./MOM HOW TO CHANGE DRESSING TO TOE.  TALKED WITH PT.ABOUT
BEING COMPLIANT WITH METFORMIN AND HIS ANTIBIOTICS. MOM WILL TRANSPORT
PT.HOME.

## 2021-01-26 NOTE — NUR
PT LEFT UNIT AT 1300
PT A&OX4 VSS. PT UP AD MILES, NO WB RESTRICTIONS. DRESSING TO R FOOT CHANGED
PRIOR TO PT LEAVING UNIT, PT EDUCATED REGARDING DRESING CHANGES AT HOME AND
PROVIDED SUPPLIES FOR WOUND CARE. PT STATES UNDERSTANDING OF DC INSTRUCTIONS
AND FOLLOW-UP INFORMATION PROVIDED. PT INSTRUCTED TO REMAIN IN ISOLATION UNTIL
AT LEAST 01/31/2021. IV TO LFA REMOVED PRIOR TO DC, PICC TO [facility] REMAINS IN
PLACE PER DR YUEN. PT DRESSED INDEPENDENTLY. PT LEFT UNIT IN
WHEELCHAIR WITH ALL PERSONAL BELONGINGS. PT HAS PRINTED RX FOR NEW
MEDICATIONS. PT LEAVES UNIT IN WHEELCHAIR, TRANSPORTED BY NURSING STAFF. PT
REFUSED LUNCH PRIOR TO LEAVING STATING HE WAS LEAVING TO GO HOME AND THAT
WOULD BE "STUPID".

## 2021-01-26 NOTE — NUR
PATIENT HAS SLEPT WELL THROUGHOUT MOST OF THE NIGHT. VSS ON RA. NO C/O PAIN.
MEDICATIONS GIVEN AS ORDERED AND CHARTED. ASSESSMENTT AS CHARTED. PICC LINE TO
RIGHT UPPER ARM- IV ABT GIVEN AS ORDERED. IV TO LEFT FOREARM-SL. DRESSING TO
BILATERAL FEET ARE C/D/I. PATIENT STILL HAVING DRY NON-PRODUCTIVE COUGH. NURSE
SPOKE WITH PATIENTS MOTHER THIS AM REGARDING PATIENT AND ANSWERING QUESTIONS
THAT SHE HAD. PATIENT INSTRUCTED TO USE CALL LIGHT WHEN NEEDING ASSISTANCE.
H0URLY ROUNDS MADE. WILL CONTINUE WITH PLAN OF CARE AND NURSING TO MONITOR.

## 2021-01-27 NOTE — CON
[address]  [address]                    CONSULTATION                  
_______________________________________________________________________________
 
Name:       JOSETRISHAAWILDA CURTIS        Room:           [address] [place]  
M.R.#:  [id number]      Account #:      [id number]  
Admission:  01/21/21     Attend Phys:    Zane Harley MD 
Discharge:  01/26/21     Date of Birth:  [birth year]  
         Report #: [id number]
                                                                     [id number]  
_______________________________________________________________________________
THIS REPORT FOR:  
 
cc:  Aby Echevarria Ghaison F. DO                                               ~
     Junior Huynh DPM           
 
 
DATE OF SERVICE:  01/23/2021
 
 
ADMISSION DIAGNOSES:  Ulceration with cellulitis, right foot.
 
CHIEF COMPLAINT/HISTORY OF PRESENT ILLNESS:  A 50-year-old  male,
admitted through the Emergency Department with worsening inflammation and
blistering to the right distal lateral foot near the fifth MTP.  He also has an
ulceration to the left plantar hallux.  He has type 2 diabetes mellitus with
peripheral neuropathy.  He relates trimming a callus to the right foot back in
November with subsequent wound development.  The etiology of the left plantar
hallux wound is less apparent.  He did present to the Emergency Department in
November and had a shot of ceftriaxone.  It is unclear whether the patient has
been following up with a provider for the wounds, although he says he has seen
Dr. Fransisco Mckeon in the past at [facility].  He is
currently on parenteral vancomycin and Zosyn.  Blood cultures are negative x 2,
right foot wound culture is growing gram-negative rods and gram-positive cocci. 
CT scan of the foot was negative for bone destruction and arterial Doppler
ultrasound showed triphasic waveforms to the extremities without signs of focal
stenosis.
 
LABORATORY DATA:  WBC 9.8, hemoglobin 12.1, hematocrit 35.3, platelets 235.  BUN
9, creatinine 0.7, glucose 257.  Hemoglobin A1c is 12.4.  Albumin is 2.9.
 
PHYSICAL EXAMINATION:  There is a superficial ulceration to the right fifth MTP
region.  There is regional erythema, edema and fluctuance with no underlying
crepitation.  There is sloughing of the overlying skin with bulla formation upon
debridement of the skin.  The underlying wound bed has a soft friable type
consistency at the lateral aspect of the fifth MTP joint.  There is no visible
or palpable bone.  There is no underlying crepitation.  There is no tunneling or
signs of deep penetration.  The skin blistering extends to the plantar aspect of
the foot, but there is no deep penetration present.  He has palpable dorsalis
pedis and posterior tibial pulses bilaterally.  There is a discrete ulceration
to the plantar aspect of the left hallux that measures roughly 2.0 x 2.0 x 0.3
cm.  There is a fibrotic base with a keratotic rim.  There is no inflammation or
cardinal signs of infection to it.
 
IMPRESSION:
1.  Soft tissue infection with ulceration, right foot, exacerbated with poorly
 
 
 
[place] [address]                    CONSULTATION                  
_______________________________________________________________________________
 
Name:       AWILDA VU        Room:           [address] [place]  
M.R.#:  [id number]      Account #:      [id number]  
Admission:  01/21/21     Attend Phys:    Zane Harley MD 
Discharge:  01/26/21     Date of Birth:  [birth year]  
         Report #: [id number]
                                                                     [id number]  
_______________________________________________________________________________
 
 
controlled type 2 diabetes mellitus.
2.  Ulceration to left plantar hallux without clinical infection.
 
PLAN:  I performed an extensive wound debridement at bedside with scissors and
forceps to remove skin and subcutaneous tissue from the right lateral foot
wound.  I took an aerobic swab culture of the underlying tissue.  The wound was
cleansed with wound cleanser and dressed with ABD, Kerlix gauze.  I ordered an
x-ray of the right foot.  I do not plan on taking him to the operating room at
this point, as I would like to see how the tissue responds to the parenteral
antibiotics and wound debridement.  I will monitor him daily during his
hospitalization.  Pending clinical course, may perform MRI.
 
 
 
 
 
 
 
 
 
 
 
 
 
 
 
 
 
 
 
 
 
 
 
 
 
 
 
 
 
 
 
<ELECTRONICALLY SIGNED>
                                        By:  Junior Huynh DPM          
01/27/21     1303
D: 01/23/21 0822_______________________________________
T: 01/23/21 0837Junior Huynh DPM             /nt

## 2021-01-27 NOTE — CON
[address]  [address]                    CONSULTATION                  
_______________________________________________________________________________
 
Name:       JOSESOTEROFINESSESAWILDA CURTIS        Room:           [address] [place]  
M.R.#:  [id number]      Account #:      [id number]  
Admission:  01/21/21     Attend Phys:    Zane Harley MD 
Discharge:  01/26/21     Date of Birth:  [birth year]  
         Report #: [id number]
                                                                     [id number]  
_______________________________________________________________________________
THIS REPORT FOR:  
 
cc:  Aby Echevarria Ghaison F. DO                                               ~
     Junior Huynh DPM           
 
 
DATE OF SERVICE:  01/23/2021
 
 
CHIEF COMPLAINT:  Followup of ulceration with soft tissue infection to right
foot centered at the fifth MTP joint.  He has concomitant ulceration to the left
plantar hallux with no clinical infection.  A recent swab culture of the right
foot wound grew Staphylococcus epidermidis.  He is on parenteral vancomycin and
Zosyn with good tolerance.  Right foot x-ray was negative for bone destruction
or osteomyelitis with specific attention to the right fifth MTP joint.  The
patient remained afebrile, relates low-grade right foot pain.  He remains
nonweightbearing.
 
LABORATORY DATA:  WBC 8.4, RBC 4.33, hemoglobin 11.4, hematocrit 34.8, platelets
274.  BUN 8, creatinine 0.8, glucose 286.
 
PHYSICAL EXAMINATION:  There is a significant decrease in erythema to the right
foot.  There is brown/black eschar to the lateral fifth MTP joint with no
underlying crepitation.  The inflammations localized around the dorsolateral to
lateral fifth MTP.  The erythema has receded along the dorsal and plantar foot. 
He has palpable pedal pulses with no pallor/cyanosis or signs of acute vascular
embarrassment.  Ulceration to the left plantar hallux is relatively unchanged
with no erythema or exposed bone to the wound bed.
 
IMPRESSION:  Soft tissue infection with ulceration, right foot; ulceration, left
plantar hallux; type 2 diabetes mellitus with peripheral neuropathy.
 
PLAN:  The wounds were cleansed and redressed with Aquacel Ag, ABDs, Kerlix and
gauze.  We will monitor the patient tomorrow, I am not anticipating any formal
surgical debridement in the operating room at this point.
 
 
 
 
 
 
 
 
<ELECTRONICALLY SIGNED>
                                        By:  Junior Huynh DPM          
01/27/21     1303
D: 01/23/21 1607_______________________________________
T: 01/23/21 1747Dasundeep Huynh DPM             /nt

## 2021-01-27 NOTE — CON
[address]  [address]                    CONSULTATION                  
_______________________________________________________________________________
 
Name:       AWILDA VU        Room:           [address] [place]  
M.R.#:  [id number]      Account #:      [id number]  
Admission:  01/21/21     Attend Phys:    Zane Harley MD 
Discharge:  01/26/21     Date of Birth:  [birth year]  
         Report #: [id number]
                                                                     [id number]  
_______________________________________________________________________________
THIS REPORT FOR:  
 
cc:  Aby Echevarria Ghaison F. DO                                               ~
     Junior Huynh DPM           
 
 
DATE OF SERVICE:  01/24/2021
 
 
CHIEF COMPLAINT:  Ulceration with soft tissue infection, right distal fifth MTP
complicated by poorly controlled type 2 diabetes mellitus.  He has concomitant
ulceration to the left plantar hallux without clinical signs of infection.  Gram
stain shows gram-negative rods and gram-positive cocci.  Prior swab culture to
the same area grew Staphylococcus epidermidis.  Post-admission CT and foot
radiographs negative for osteomyelitis, subcutaneous emphysema or abscess. 
Blood cultures negative x 2.  He remains on parenteral vancomycin and Zosyn with
good tolerance.  He has remained afebrile, relates improved appetite.  He has
low-grade pain to the right fifth MTP area.
 
LABORATORY DATA:  WBC 7.1, RBC 4.47, hemoglobin 12.0, hematocrit 35.9 and
platelets 315.  BUN 8, creatinine 0.7 and glucose 109.  Hemoglobin A1c 12.4.
 
PHYSICAL EXAMINATION:  Temperature 98.2, pulse 91, respirations 18 and blood
pressure 157/97.  The erythema to the right distal fifth MTP is decreased since
yesterday, but still centered around the lateral wound which is mostly a black
eschar.  The wound measures roughly 3.0 x 2.7 cm and has a hard black eschar
with a small peripheral area of red granulation.  The erythema is localized
circumferentially around the eschar extending to the dorsal foot, but receding
since yesterday.  There is no significant erythema to the plantar aspect of the
foot.  There is no fluctuance or crepitation to the region.  He has palpable
dorsalis pedis and posterior tibial pulses bilaterally with immediate periwound
and digital capillary refill.  Ulceration to the left plantar hallux is roughly
1.5 x 1.0 x 0.3 cm with red granulation and a peripheral rim of keratosis.  No
erythema or cardinal signs of infection to it.
 
IMPRESSION:  Soft tissue infection with ulceration, right distal lateral foot
with concomitant type 2 diabetes mellitus; cannot rule out septic
arthritis/osteomyelitis.
 
PLAN:  I recommend MRI with and without contrast, and I see that has already
been ordered this morning to be performed tomorrow.  I explained to patient that
I cannot rule out the possibility of a right distal fifth ray resection for
osteomyelitis.  I explained that foot radiographs and CT scan are very
insensitive to acute osteomyelitis, where an MRI will show intramedullary
inflammation within the bone.  He understands that he is at risk for losing part
 
 
 
[place] [address]                    CONSULTATION                  
_______________________________________________________________________________
 
Name:       AWILDA VU        Room:           [address] [place]  
M.R.#:  [id number]      Account #:      [id number]  
Admission:  01/21/21     Attend Phys:    Zane Harley MD 
Discharge:  01/26/21     Date of Birth:  [birth year]  
         Report #: [id number]
                                                                     [id number]  
_______________________________________________________________________________
 
 
of his foot, although I assured him that I will remain conservative as much as
possible.  I will discuss with medicine regarding long-term antibiotics.
 
 
 
 
 
 
 
 
 
 
 
 
 
 
 
 
 
 
 
 
 
 
 
 
 
 
 
 
 
 
 
 
 
 
 
 
 
 
 
 
<ELECTRONICALLY SIGNED>
                                        By:  Junior Huynh DPM          
01/27/21     1303
D: 01/24/21 1240_______________________________________
T: 01/24/21 2043Dtiti Huynh DPM             /nt

## 2021-02-03 ENCOUNTER — HOSPITAL ENCOUNTER (OUTPATIENT)
Dept: HOSPITAL 96 - M.WC | Age: 51
End: 2021-02-03
Attending: PODIATRIST
Payer: COMMERCIAL

## 2021-02-03 DIAGNOSIS — L97.512: ICD-10-CM

## 2021-02-03 DIAGNOSIS — E11.621: Primary | ICD-10-CM

## 2021-02-03 DIAGNOSIS — L84: ICD-10-CM

## 2021-02-03 DIAGNOSIS — Z87.891: ICD-10-CM

## 2021-02-03 DIAGNOSIS — L97.522: ICD-10-CM

## 2021-02-03 DIAGNOSIS — I10: ICD-10-CM

## 2021-02-03 LAB
ABSOLUTE BASOPHILS: 0.2 THOU/UL (ref 0–0.2)
ABSOLUTE EOSINOPHILS: 0.1 THOU/UL (ref 0–0.7)
ABSOLUTE MONOCYTES: 0.5 THOU/UL (ref 0–1.2)
ANION GAP SERPL CALC-SCNC: 12 MMOL/L (ref 7–16)
BASOPHILS NFR BLD AUTO: 2 %
BUN SERPL-MCNC: 17 MG/DL (ref 7–18)
CALCIUM SERPL-MCNC: 9.4 MG/DL (ref 8.5–10.1)
CHLORIDE SERPL-SCNC: 100 MMOL/L (ref 98–107)
CO2 SERPL-SCNC: 27 MMOL/L (ref 21–32)
CREAT SERPL-MCNC: 0.8 MG/DL (ref 0.6–1.3)
EOSINOPHIL NFR BLD: 1.6 %
ESR (SEDRATE): 6 MM/HR (ref 0–20)
GLUCOSE SERPL-MCNC: 264 MG/DL (ref 70–99)
GRANULOCYTES NFR BLD MANUAL: 74.8 %
HCT VFR BLD CALC: 41.3 % (ref 42–52)
HGB BLD-MCNC: 13.6 GM/DL (ref 14–18)
LYMPHOCYTES # BLD: 1.3 THOU/UL (ref 0.8–5.3)
LYMPHOCYTES NFR BLD AUTO: 15.9 %
MCH RBC QN AUTO: 26 PG (ref 26–34)
MCHC RBC AUTO-ENTMCNC: 32.9 G/DL (ref 28–37)
MCV RBC: 79.2 FL (ref 80–100)
MONOCYTES NFR BLD: 5.7 %
MPV: 7.2 FL. (ref 7.2–11.1)
NEUTROPHILS # BLD: 6 THOU/UL (ref 1.6–8.1)
NUCLEATED RBCS: 0 /100WBC
PLATELET COUNT*: 334 THOU/UL (ref 150–400)
POTASSIUM SERPL-SCNC: 3.7 MMOL/L (ref 3.5–5.1)
RBC # BLD AUTO: 5.22 MIL/UL (ref 4.5–6)
RDW-CV: 14.2 % (ref 10.5–14.5)
SODIUM SERPL-SCNC: 139 MMOL/L (ref 136–145)
WBC # BLD AUTO: 8 THOU/UL (ref 4–11)

## 2021-02-10 ENCOUNTER — HOSPITAL ENCOUNTER (OUTPATIENT)
Dept: HOSPITAL 96 - M.WC | Age: 51
End: 2021-02-10
Attending: PODIATRIST
Payer: COMMERCIAL

## 2021-02-10 DIAGNOSIS — L97.512: ICD-10-CM

## 2021-02-10 DIAGNOSIS — L97.522: ICD-10-CM

## 2021-02-10 DIAGNOSIS — E11.621: Primary | ICD-10-CM

## 2021-02-10 DIAGNOSIS — E11.40: ICD-10-CM

## 2021-02-10 DIAGNOSIS — Z87.891: ICD-10-CM

## 2021-02-10 DIAGNOSIS — I10: ICD-10-CM

## 2021-02-10 DIAGNOSIS — L84: ICD-10-CM

## 2021-02-10 DIAGNOSIS — L03.115: ICD-10-CM

## 2021-02-19 ENCOUNTER — HOSPITAL ENCOUNTER (OUTPATIENT)
Dept: HOSPITAL 96 - M.WC | Age: 51
End: 2021-02-19
Attending: FAMILY MEDICINE
Payer: COMMERCIAL

## 2021-02-19 DIAGNOSIS — Z87.891: ICD-10-CM

## 2021-02-19 DIAGNOSIS — L03.115: ICD-10-CM

## 2021-02-19 DIAGNOSIS — L03.116: ICD-10-CM

## 2021-02-19 DIAGNOSIS — L97.522: ICD-10-CM

## 2021-02-19 DIAGNOSIS — I10: ICD-10-CM

## 2021-02-19 DIAGNOSIS — L97.512: ICD-10-CM

## 2021-02-19 DIAGNOSIS — L84: ICD-10-CM

## 2021-02-19 DIAGNOSIS — E11.42: ICD-10-CM

## 2021-02-19 DIAGNOSIS — E11.621: Primary | ICD-10-CM

## 2021-02-24 ENCOUNTER — HOSPITAL ENCOUNTER (OUTPATIENT)
Dept: HOSPITAL 96 - M.WC | Age: 51
End: 2021-02-24
Attending: PODIATRIST
Payer: COMMERCIAL

## 2021-02-24 DIAGNOSIS — Z87.891: ICD-10-CM

## 2021-02-24 DIAGNOSIS — L97.522: ICD-10-CM

## 2021-02-24 DIAGNOSIS — E11.42: ICD-10-CM

## 2021-02-24 DIAGNOSIS — L84: ICD-10-CM

## 2021-02-24 DIAGNOSIS — E11.621: Primary | ICD-10-CM

## 2021-02-24 DIAGNOSIS — I10: ICD-10-CM

## 2021-02-24 DIAGNOSIS — L03.115: ICD-10-CM

## 2021-02-24 DIAGNOSIS — L97.512: ICD-10-CM
